# Patient Record
Sex: FEMALE | Race: AMERICAN INDIAN OR ALASKA NATIVE | NOT HISPANIC OR LATINO | URBAN - METROPOLITAN AREA
[De-identification: names, ages, dates, MRNs, and addresses within clinical notes are randomized per-mention and may not be internally consistent; named-entity substitution may affect disease eponyms.]

---

## 2020-01-15 ENCOUNTER — INPATIENT (INPATIENT)
Facility: HOSPITAL | Age: 75
LOS: 1 days | Discharge: HOME | End: 2020-01-17
Attending: INTERNAL MEDICINE | Admitting: INTERNAL MEDICINE
Payer: MEDICARE

## 2020-01-15 VITALS
HEART RATE: 89 BPM | SYSTOLIC BLOOD PRESSURE: 153 MMHG | DIASTOLIC BLOOD PRESSURE: 92 MMHG | WEIGHT: 100.09 LBS | TEMPERATURE: 97 F | RESPIRATION RATE: 17 BRPM | OXYGEN SATURATION: 96 %

## 2020-01-15 DIAGNOSIS — Z98.890 OTHER SPECIFIED POSTPROCEDURAL STATES: Chronic | ICD-10-CM

## 2020-01-15 LAB
ALBUMIN SERPL ELPH-MCNC: 3.7 G/DL — SIGNIFICANT CHANGE UP (ref 3.5–5.2)
ALP SERPL-CCNC: 100 U/L — SIGNIFICANT CHANGE UP (ref 30–115)
ALT FLD-CCNC: 33 U/L — SIGNIFICANT CHANGE UP (ref 0–41)
ANION GAP SERPL CALC-SCNC: 12 MMOL/L — SIGNIFICANT CHANGE UP (ref 7–14)
APPEARANCE UR: CLEAR — SIGNIFICANT CHANGE UP
APTT BLD: 33.5 SEC — SIGNIFICANT CHANGE UP (ref 27–39.2)
AST SERPL-CCNC: 57 U/L — HIGH (ref 0–41)
BACTERIA # UR AUTO: NEGATIVE — SIGNIFICANT CHANGE UP
BASOPHILS # BLD AUTO: 0.03 K/UL — SIGNIFICANT CHANGE UP (ref 0–0.2)
BASOPHILS NFR BLD AUTO: 0.4 % — SIGNIFICANT CHANGE UP (ref 0–1)
BILIRUB SERPL-MCNC: 1 MG/DL — SIGNIFICANT CHANGE UP (ref 0.2–1.2)
BILIRUB UR-MCNC: NEGATIVE — SIGNIFICANT CHANGE UP
BUN SERPL-MCNC: 23 MG/DL — HIGH (ref 10–20)
CALCIUM SERPL-MCNC: 9.8 MG/DL — SIGNIFICANT CHANGE UP (ref 8.5–10.1)
CHLORIDE SERPL-SCNC: 99 MMOL/L — SIGNIFICANT CHANGE UP (ref 98–110)
CK MB CFR SERPL CALC: <1 NG/ML — SIGNIFICANT CHANGE UP (ref 0.6–6.3)
CK MB CFR SERPL CALC: <1 NG/ML — SIGNIFICANT CHANGE UP (ref 0.6–6.3)
CK SERPL-CCNC: 65 U/L — SIGNIFICANT CHANGE UP (ref 0–225)
CO2 SERPL-SCNC: 19 MMOL/L — SIGNIFICANT CHANGE UP (ref 17–32)
COLOR SPEC: YELLOW — SIGNIFICANT CHANGE UP
CREAT SERPL-MCNC: 0.7 MG/DL — SIGNIFICANT CHANGE UP (ref 0.7–1.5)
DIFF PNL FLD: NEGATIVE — SIGNIFICANT CHANGE UP
EOSINOPHIL # BLD AUTO: 0.01 K/UL — SIGNIFICANT CHANGE UP (ref 0–0.7)
EOSINOPHIL NFR BLD AUTO: 0.1 % — SIGNIFICANT CHANGE UP (ref 0–8)
EPI CELLS # UR: 1 /HPF — SIGNIFICANT CHANGE UP (ref 0–5)
FLU A RESULT: NEGATIVE — SIGNIFICANT CHANGE UP
FLU A RESULT: NEGATIVE — SIGNIFICANT CHANGE UP
FLUAV AG NPH QL: NEGATIVE — SIGNIFICANT CHANGE UP
FLUBV AG NPH QL: NEGATIVE — SIGNIFICANT CHANGE UP
GLUCOSE BLDC GLUCOMTR-MCNC: 116 MG/DL — HIGH (ref 70–99)
GLUCOSE BLDC GLUCOMTR-MCNC: 227 MG/DL — HIGH (ref 70–99)
GLUCOSE SERPL-MCNC: 162 MG/DL — HIGH (ref 70–99)
GLUCOSE UR QL: ABNORMAL
HCT VFR BLD CALC: 31.7 % — LOW (ref 37–47)
HGB BLD-MCNC: 11 G/DL — LOW (ref 12–16)
HYALINE CASTS # UR AUTO: 0 /LPF — SIGNIFICANT CHANGE UP (ref 0–7)
IMM GRANULOCYTES NFR BLD AUTO: 0.9 % — HIGH (ref 0.1–0.3)
INR BLD: 1.03 RATIO — SIGNIFICANT CHANGE UP (ref 0.65–1.3)
KETONES UR-MCNC: NEGATIVE — SIGNIFICANT CHANGE UP
LEUKOCYTE ESTERASE UR-ACNC: NEGATIVE — SIGNIFICANT CHANGE UP
LYMPHOCYTES # BLD AUTO: 0.85 K/UL — LOW (ref 1.2–3.4)
LYMPHOCYTES # BLD AUTO: 10.7 % — LOW (ref 20.5–51.1)
MAGNESIUM SERPL-MCNC: 1.9 MG/DL — SIGNIFICANT CHANGE UP (ref 1.8–2.4)
MCHC RBC-ENTMCNC: 31.8 PG — HIGH (ref 27–31)
MCHC RBC-ENTMCNC: 34.7 G/DL — SIGNIFICANT CHANGE UP (ref 32–37)
MCV RBC AUTO: 91.6 FL — SIGNIFICANT CHANGE UP (ref 81–99)
MONOCYTES # BLD AUTO: 0.63 K/UL — HIGH (ref 0.1–0.6)
MONOCYTES NFR BLD AUTO: 7.9 % — SIGNIFICANT CHANGE UP (ref 1.7–9.3)
NEUTROPHILS # BLD AUTO: 6.39 K/UL — SIGNIFICANT CHANGE UP (ref 1.4–6.5)
NEUTROPHILS NFR BLD AUTO: 80 % — HIGH (ref 42.2–75.2)
NITRITE UR-MCNC: NEGATIVE — SIGNIFICANT CHANGE UP
NRBC # BLD: 0 /100 WBCS — SIGNIFICANT CHANGE UP (ref 0–0)
NT-PROBNP SERPL-SCNC: 783 PG/ML — HIGH (ref 0–300)
PH UR: 6.5 — SIGNIFICANT CHANGE UP (ref 5–8)
PLATELET # BLD AUTO: 206 K/UL — SIGNIFICANT CHANGE UP (ref 130–400)
POTASSIUM SERPL-MCNC: 4.2 MMOL/L — SIGNIFICANT CHANGE UP (ref 3.5–5)
POTASSIUM SERPL-SCNC: 4.2 MMOL/L — SIGNIFICANT CHANGE UP (ref 3.5–5)
PROT SERPL-MCNC: 7.6 G/DL — SIGNIFICANT CHANGE UP (ref 6–8)
PROT UR-MCNC: ABNORMAL
PROTHROM AB SERPL-ACNC: 11.8 SEC — SIGNIFICANT CHANGE UP (ref 9.95–12.87)
RBC # BLD: 3.46 M/UL — LOW (ref 4.2–5.4)
RBC # FLD: 17.4 % — HIGH (ref 11.5–14.5)
RBC CASTS # UR COMP ASSIST: 1 /HPF — SIGNIFICANT CHANGE UP (ref 0–4)
RSV RESULT: NEGATIVE — SIGNIFICANT CHANGE UP
RSV RNA RESP QL NAA+PROBE: NEGATIVE — SIGNIFICANT CHANGE UP
SODIUM SERPL-SCNC: 130 MMOL/L — LOW (ref 135–146)
SP GR SPEC: 1.01 — LOW (ref 1.01–1.02)
TROPONIN T SERPL-MCNC: <0.01 NG/ML — SIGNIFICANT CHANGE UP
TROPONIN T SERPL-MCNC: <0.01 NG/ML — SIGNIFICANT CHANGE UP
UROBILINOGEN FLD QL: SIGNIFICANT CHANGE UP
WBC # BLD: 7.98 K/UL — SIGNIFICANT CHANGE UP (ref 4.8–10.8)
WBC # FLD AUTO: 7.98 K/UL — SIGNIFICANT CHANGE UP (ref 4.8–10.8)
WBC UR QL: 1 /HPF — SIGNIFICANT CHANGE UP (ref 0–5)

## 2020-01-15 PROCEDURE — 93010 ELECTROCARDIOGRAM REPORT: CPT | Mod: 76

## 2020-01-15 PROCEDURE — 99291 CRITICAL CARE FIRST HOUR: CPT

## 2020-01-15 PROCEDURE — 71045 X-RAY EXAM CHEST 1 VIEW: CPT | Mod: 26

## 2020-01-15 RX ORDER — DEXTROSE 50 % IN WATER 50 %
25 SYRINGE (ML) INTRAVENOUS ONCE
Refills: 0 | Status: DISCONTINUED | OUTPATIENT
Start: 2020-01-15 | End: 2020-01-17

## 2020-01-15 RX ORDER — INSULIN LISPRO 100/ML
VIAL (ML) SUBCUTANEOUS
Refills: 0 | Status: DISCONTINUED | OUTPATIENT
Start: 2020-01-15 | End: 2020-01-17

## 2020-01-15 RX ORDER — LOSARTAN POTASSIUM 100 MG/1
100 TABLET, FILM COATED ORAL DAILY
Refills: 0 | Status: DISCONTINUED | OUTPATIENT
Start: 2020-01-15 | End: 2020-01-17

## 2020-01-15 RX ORDER — ENOXAPARIN SODIUM 100 MG/ML
40 INJECTION SUBCUTANEOUS DAILY
Refills: 0 | Status: DISCONTINUED | OUTPATIENT
Start: 2020-01-15 | End: 2020-01-17

## 2020-01-15 RX ORDER — ATENOLOL 25 MG/1
50 TABLET ORAL DAILY
Refills: 0 | Status: DISCONTINUED | OUTPATIENT
Start: 2020-01-15 | End: 2020-01-17

## 2020-01-15 RX ORDER — DEXTROSE 50 % IN WATER 50 %
12.5 SYRINGE (ML) INTRAVENOUS ONCE
Refills: 0 | Status: DISCONTINUED | OUTPATIENT
Start: 2020-01-15 | End: 2020-01-17

## 2020-01-15 RX ORDER — PANTOPRAZOLE SODIUM 20 MG/1
40 TABLET, DELAYED RELEASE ORAL
Refills: 0 | Status: DISCONTINUED | OUTPATIENT
Start: 2020-01-15 | End: 2020-01-17

## 2020-01-15 RX ORDER — LIPASE/PROTEASE/AMYLASE 16-48-48K
1 CAPSULE,DELAYED RELEASE (ENTERIC COATED) ORAL
Refills: 0 | Status: DISCONTINUED | OUTPATIENT
Start: 2020-01-15 | End: 2020-01-15

## 2020-01-15 RX ORDER — LIPASE/PROTEASE/AMYLASE 16-48-48K
2 CAPSULE,DELAYED RELEASE (ENTERIC COATED) ORAL
Refills: 0 | Status: DISCONTINUED | OUTPATIENT
Start: 2020-01-15 | End: 2020-01-17

## 2020-01-15 RX ORDER — ACETAMINOPHEN 500 MG
975 TABLET ORAL ONCE
Refills: 0 | Status: COMPLETED | OUTPATIENT
Start: 2020-01-15 | End: 2020-01-15

## 2020-01-15 RX ORDER — RANOLAZINE 500 MG/1
500 TABLET, FILM COATED, EXTENDED RELEASE ORAL
Refills: 0 | Status: DISCONTINUED | OUTPATIENT
Start: 2020-01-15 | End: 2020-01-17

## 2020-01-15 RX ORDER — SODIUM CHLORIDE 9 MG/ML
1500 INJECTION, SOLUTION INTRAVENOUS ONCE
Refills: 0 | Status: COMPLETED | OUTPATIENT
Start: 2020-01-15 | End: 2020-01-15

## 2020-01-15 RX ORDER — SODIUM CHLORIDE 9 MG/ML
1000 INJECTION, SOLUTION INTRAVENOUS
Refills: 0 | Status: DISCONTINUED | OUTPATIENT
Start: 2020-01-15 | End: 2020-01-17

## 2020-01-15 RX ORDER — DEXTROSE 50 % IN WATER 50 %
15 SYRINGE (ML) INTRAVENOUS ONCE
Refills: 0 | Status: DISCONTINUED | OUTPATIENT
Start: 2020-01-15 | End: 2020-01-17

## 2020-01-15 RX ORDER — GLUCAGON INJECTION, SOLUTION 0.5 MG/.1ML
1 INJECTION, SOLUTION SUBCUTANEOUS ONCE
Refills: 0 | Status: DISCONTINUED | OUTPATIENT
Start: 2020-01-15 | End: 2020-01-17

## 2020-01-15 RX ORDER — ASPIRIN/CALCIUM CARB/MAGNESIUM 324 MG
81 TABLET ORAL DAILY
Refills: 0 | Status: DISCONTINUED | OUTPATIENT
Start: 2020-01-15 | End: 2020-01-17

## 2020-01-15 RX ORDER — INSULIN LISPRO 100/ML
6 VIAL (ML) SUBCUTANEOUS
Refills: 0 | Status: DISCONTINUED | OUTPATIENT
Start: 2020-01-15 | End: 2020-01-17

## 2020-01-15 RX ORDER — INSULIN GLARGINE 100 [IU]/ML
9 INJECTION, SOLUTION SUBCUTANEOUS AT BEDTIME
Refills: 0 | Status: DISCONTINUED | OUTPATIENT
Start: 2020-01-15 | End: 2020-01-17

## 2020-01-15 RX ORDER — ATORVASTATIN CALCIUM 80 MG/1
20 TABLET, FILM COATED ORAL AT BEDTIME
Refills: 0 | Status: DISCONTINUED | OUTPATIENT
Start: 2020-01-15 | End: 2020-01-17

## 2020-01-15 RX ADMIN — RANOLAZINE 500 MILLIGRAM(S): 500 TABLET, FILM COATED, EXTENDED RELEASE ORAL at 17:35

## 2020-01-15 RX ADMIN — SODIUM CHLORIDE 1500 MILLILITER(S): 9 INJECTION, SOLUTION INTRAVENOUS at 13:20

## 2020-01-15 RX ADMIN — INSULIN GLARGINE 9 UNIT(S): 100 INJECTION, SOLUTION SUBCUTANEOUS at 22:28

## 2020-01-15 RX ADMIN — Medication 975 MILLIGRAM(S): at 13:30

## 2020-01-15 RX ADMIN — ATORVASTATIN CALCIUM 20 MILLIGRAM(S): 80 TABLET, FILM COATED ORAL at 22:30

## 2020-01-15 NOTE — ED PROVIDER NOTE - OBJECTIVE STATEMENT
74 y.o female w/ hx of HTN, HLD, MI presents to the ED for evaluation of multiple complaints.  Per family has been experiencing URI sxs x 3 days.  Intermittently productive cough, congestion and feer.  Over past 2 days midsternal chest pressure, worse w/ exertion, no radiation, nonpleuritic, moderate severity. Does not know if this feels similar to previous MI.  Denies back pain, dyspnea, edema of lower extremities, calf pain, hemoptysis.

## 2020-01-15 NOTE — H&P ADULT - HISTORY OF PRESENT ILLNESS
74 y/f with PMH of CAD s/p stents in 2018, DM, Pancreatic cancer was brought in by family for chest pain that started this AM.   As per the son at the bedside, the chest pain is midsternal, throbbing, non radiating, not related to exertion, constant. She doesnt have any cough, but was having congestion and was trying to get phlegm out but not able to do so. She didnt have any fever or chills at home. She has chronic intermittent belly pain, which family attributes to her pancreatic cancer, in the past she had pancreatic tail resection, partial liver resection, however the cancer recently recurred  and was told that it is inoperable. She has chronic intemittent diarrhea, no change in urinary habits. No lower ext swelling or edema. Her mental status has worsened over the past few months.

## 2020-01-15 NOTE — ED PROVIDER NOTE - NS ED ROS FT
Constitutional: See HPI.  Eyes: No visual changes, eye pain or discharge. No Photophobia  ENMT: + nasal congestion. No hearing changes, pain, discharge or infections. No neck pain or stiffness. No limited ROM  Cardiac: + chest pain. No SOB or edema.   Respiratory: + cough. No  respiratory distress. No hemoptysis.   GI: No nausea, vomiting, diarrhea or abdominal pain.  : No dysuria, frequency or burning. No Discharge  MS: No myalgia, muscle weakness, joint pain or back pain.  Neuro: No headache or weakness.   Skin: No skin rash.  Except as documented in the HPI, all other systems are negative.

## 2020-01-15 NOTE — H&P ADULT - NSHPPHYSICALEXAM_GEN_ALL_CORE
T(C): 37 (01-15-20 @ 15:42), Max: 38.3 (01-15-20 @ 13:20)  HR: 78 (01-15-20 @ 15:42) (78 - 89)  BP: 134/76 (01-15-20 @ 15:42) (134/76 - 153/92)  RR: 16 (01-15-20 @ 15:42) (16 - 18)  SpO2: 97% (01-15-20 @ 15:42) (96% - 100%)  PHYSICAL EXAM:    Constitutional: Lying in bed no acute distress    Eyes: PERLAA    ENMT: No facial deviation    Neck: No mass/ JVD    Respiratory: B/l clear, no crackles, wheeze    Cardiovascular: Regular rate and rhythm, no murmur    Gastrointestinal: soft non tender, no organomegaly    Extremities: No edema, B/l lower ext    Neurological: AO x 3, non focal

## 2020-01-15 NOTE — ED ADULT NURSE NOTE - OBJECTIVE STATEMENT
pt c/o URI symptoms for past couple of days, + cough and chest discomfort, having worsening generalized malaise and unable to ambulate today. t max at home was 99.6. STEMI code activated

## 2020-01-15 NOTE — H&P ADULT - NSICDXPASTMEDICALHX_GEN_ALL_CORE_FT
PAST MEDICAL HISTORY:  Diabetes     Hypertension     MI (myocardial infarction)     Pancreatic cancer

## 2020-01-15 NOTE — ED PROVIDER NOTE - ATTENDING CONTRIBUTION TO CARE
Patient is a 73 yo female h/o MI s/p intervention family unsure if stent or balloon angioplast. Patient here for 3 days of cough congestion subjective fever and pleuritic midsrternal cp, 8/10, unsure if feels liek when she had an MI in the past. No SOB abd pain back pain    Patient awake alert oriented uncomfortable appearing CTAB RRRR no murmur abd soft nt    EKG with elevation in AVR and inferiorlateral depressions including in V2 V3, posterior EK performed no STEMI, given chest pain and EKG changes in AVR and generalzied STEMI called. Cardiology evaluted bedside echo done WNL cancelled STEMI. workup negative thus far will require admission for repeat tropnin and inpatient workup for chest pain with elevated amalia/heart score and EKG changes family comfortable with plan    I spent 50 minutes of critical care time with the patient bedside discussions labs and imaging and ekg interpretation consultation

## 2020-01-15 NOTE — ED PROVIDER NOTE - CARE PLAN
Principal Discharge DX:	Chest pain  Secondary Diagnosis:	URI (upper respiratory infection)  Secondary Diagnosis:	Fever

## 2020-01-15 NOTE — H&P ADULT - ASSESSMENT
74 y/f with PMH of CAD s/p stents in 2018, DM, Pancreatic cancer was brought in by family for chest pain that started this AM and congestion for few days:    # Atypical chest pain;    EKG shows T wave inversion in anterolateral leads, ST elevation in aVR  STEMI code called in ED, was cancelled by Cardio team  Troponin 1st set negative, f/u x 2  F/u cardiology  F/u official ECHO (bedside echo unremarkable)  C/w Statin, Aspirin, Atenolol    # Fever:  With associated congestion, could be Upper respiratory tract infection, supportive care  Flu -ve, CXR clear  UA clear    # DM:  Monitor FS and insulin if >180    #Pancreatic cancer:  C/w Creon  Will f/u with oncology as outpatient    #Diet:  DASH/ Carb consistent    # Activity:  As tolerated    # DVT ppx:  lovenox

## 2020-01-15 NOTE — CHART NOTE - NSCHARTNOTEFT_GEN_A_CORE
STEMI code was called overhead, and I arrived to physically examine and evaluate the patient at the bedside. Pt. speaks a dialect of Cantonese, and translation was done by son and daughter at the bedside. Pt. reports having sob, cough, chest pain, and subjective fevers, for the past few days. Pt. reported had CAD, MI and PCI in 2018 as per family. EKG showed mild aVR ST elevation and depressions in inferior, and anterolateral leads. Quick bedside echo showed grossly normal LV function, and no significant wall motion abnormalities. STEMI code was cancelled after discussion with the interventionalist on call (Dr. Pierson). Recommend admitting pt. with workup for ACS, and rule out other etiologies of chest pain. Detailed consult note to follow.     EKG: NSR, mild aVR ST elevation and depressions in inferior, and anterolateral leads.

## 2020-01-15 NOTE — ED ADULT NURSE NOTE - NSIMPLEMENTINTERV_GEN_ALL_ED
Implemented All Fall with Harm Risk Interventions:  [place] to call system. Call bell, personal items and telephone within reach. Instruct patient to call for assistance. Room bathroom lighting operational. Non-slip footwear when patient is off stretcher. Physically safe environment: no spills, clutter or unnecessary equipment. Stretcher in lowest position, wheels locked, appropriate side rails in place. Provide visual cue, wrist band, yellow gown, etc. Monitor gait and stability. Monitor for mental status changes and reorient to person, place, and time. Review medications for side effects contributing to fall risk. Reinforce activity limits and safety measures with patient and family. Provide visual clues: red socks.

## 2020-01-15 NOTE — H&P ADULT - ATTENDING COMMENTS
HPI as above.  Interval history: Pt seen and examined at bedside. No sob.  No further chest pain. Pt did state that she when she had the chest pain it radiated to her back. Pt has hx of MI in the past.   Vital Signs (24 Hrs):  T(C): 37.1 (01-16-20 @ 07:33), Max: 38.6 (01-16-20 @ 06:52)  HR: 84 (01-16-20 @ 07:33) (70 - 84)  BP: 146/78 (01-16-20 @ 07:33) (134/76 - 146/78)  RR: 18 (01-16-20 @ 07:33) (16 - 18)  SpO2: 95% (01-16-20 @ 07:33) (95% - 97%)  Wt(kg): --  Daily     Daily     I&O's Summary    PHYSICAL EXAM:  GENERAL: NAD, well-developed  HEAD:  Atraumatic, Normocephalic  EYES: EOMI, PERRLA, conjunctiva and sclera clear  NECK: Supple, No JVD, bulging neck vein   CHEST/LUNG: Clear to auscultation bilaterally; No wheeze  HEART: Regular rate and rhythm; No murmurs, rubs, or gallops  ABDOMEN: Soft, Nontender, Nondistended; Bowel sounds present  EXTREMITIES:  2+ Peripheral Pulses, No clubbing, cyanosis, or edema  PSYCH: AAOx3  NEUROLOGY: non-focal  SKIN: No rashes or lesions    Labs reviewed  Imaging reviewed: < from: Xray Chest 1 View-PORTABLE IMMEDIATE (01.16.20 @ 08:33) >    Impression:      No radiographic evidence of acute cardiopulmonary disease.    < end of copied text >      EKG reviewed: < from: 12 Lead ECG (01.15.20 @ 13:30) >    Diagnosis Line Normal sinus rhythm  Anterior infarct , age undetermined  ST & T wave abnormality, consider lateral ischemia  Abnormal ECG    < end of copied text >    Plan  #Chest pain r/o ACS- likely Atypical  -given the radaition to the back will r/o aortic dissection. CT chest   -Trop neg x3   -EKG changes are old   -follow up echo    #Fever   -UA neg  -cxr neg  -dvt neg, Venous thrombosis of left peroneal vein branch   -Flu and rsv neg   -Follow up blood culture   -If spikes fever again, start broad spectrum antibiotics, ID consult     #rest as above    #Progress Note Handoff  Pending (specify):  Consults___cardio______, Tests________, Test Results_______, Other_________  Family discussion: lexa pt family and agreed to plan  Disposition: Home__x_/SNF___/Other________/Unknown at this time________

## 2020-01-15 NOTE — ED PROVIDER NOTE - PHYSICAL EXAMINATION
CONST: mild distress.  EYES:  Sclera and conjunctiva clear.  NECK: Non-tender, supple,  CARD: Normal S1 S2; Normal rate and rhythm  RESP: Equal BS B/L, No wheezes, rhonchi or rales. No distress  GI: Soft, non-tender, non-distended.  MS: Normal ROM in all extremities. No edema of lower extremities, no calf pain, radial pulses 2+ bilaterally  SKIN: Warm, dry, no acute rashes. Good turgor  NEURO: A&Ox3, No focal deficits. Strength 5/5 with no sensory deficits

## 2020-01-15 NOTE — H&P ADULT - NSHPLABSRESULTS_GEN_ALL_CORE
11.0   7.98  )-----------( 206      ( 15 Nelson 2020 13:25 )             31.7       01-15    130<L>  |  99  |  23<H>  ----------------------------<  162<H>  4.2   |  19  |  0.7    Ca    9.8      15 Nelson 2020 13:25  Mg     1.9     01-15    TPro  7.6  /  Alb  3.7  /  TBili  1.0  /  DBili  x   /  AST  57<H>  /  ALT  33  /  AlkPhos  100  01-15

## 2020-01-16 LAB
ALBUMIN SERPL ELPH-MCNC: 3.6 G/DL — SIGNIFICANT CHANGE UP (ref 3.5–5.2)
ALP SERPL-CCNC: 126 U/L — HIGH (ref 30–115)
ALT FLD-CCNC: 59 U/L — HIGH (ref 0–41)
ANION GAP SERPL CALC-SCNC: 12 MMOL/L — SIGNIFICANT CHANGE UP (ref 7–14)
AST SERPL-CCNC: 112 U/L — HIGH (ref 0–41)
BILIRUB SERPL-MCNC: 0.8 MG/DL — SIGNIFICANT CHANGE UP (ref 0.2–1.2)
BUN SERPL-MCNC: 20 MG/DL — SIGNIFICANT CHANGE UP (ref 10–20)
CALCIUM SERPL-MCNC: 9.7 MG/DL — SIGNIFICANT CHANGE UP (ref 8.5–10.1)
CHLORIDE SERPL-SCNC: 105 MMOL/L — SIGNIFICANT CHANGE UP (ref 98–110)
CK MB CFR SERPL CALC: 1 NG/ML — SIGNIFICANT CHANGE UP (ref 0.6–6.3)
CO2 SERPL-SCNC: 22 MMOL/L — SIGNIFICANT CHANGE UP (ref 17–32)
CREAT SERPL-MCNC: 0.7 MG/DL — SIGNIFICANT CHANGE UP (ref 0.7–1.5)
CULTURE RESULTS: SIGNIFICANT CHANGE UP
ESTIMATED AVERAGE GLUCOSE: 131 MG/DL — HIGH (ref 68–114)
GLUCOSE BLDC GLUCOMTR-MCNC: 114 MG/DL — HIGH (ref 70–99)
GLUCOSE BLDC GLUCOMTR-MCNC: 118 MG/DL — HIGH (ref 70–99)
GLUCOSE BLDC GLUCOMTR-MCNC: 126 MG/DL — HIGH (ref 70–99)
GLUCOSE BLDC GLUCOMTR-MCNC: 247 MG/DL — HIGH (ref 70–99)
GLUCOSE SERPL-MCNC: 134 MG/DL — HIGH (ref 70–99)
HBA1C BLD-MCNC: 6.2 % — HIGH (ref 4–5.6)
HCT VFR BLD CALC: 32.1 % — LOW (ref 37–47)
HGB BLD-MCNC: 10.6 G/DL — LOW (ref 12–16)
MCHC RBC-ENTMCNC: 30.2 PG — SIGNIFICANT CHANGE UP (ref 27–31)
MCHC RBC-ENTMCNC: 33 G/DL — SIGNIFICANT CHANGE UP (ref 32–37)
MCV RBC AUTO: 91.5 FL — SIGNIFICANT CHANGE UP (ref 81–99)
NRBC # BLD: 0 /100 WBCS — SIGNIFICANT CHANGE UP (ref 0–0)
PLATELET # BLD AUTO: 196 K/UL — SIGNIFICANT CHANGE UP (ref 130–400)
POTASSIUM SERPL-MCNC: 4 MMOL/L — SIGNIFICANT CHANGE UP (ref 3.5–5)
POTASSIUM SERPL-SCNC: 4 MMOL/L — SIGNIFICANT CHANGE UP (ref 3.5–5)
PROT SERPL-MCNC: 7.4 G/DL — SIGNIFICANT CHANGE UP (ref 6–8)
RBC # BLD: 3.51 M/UL — LOW (ref 4.2–5.4)
RBC # FLD: 17.4 % — HIGH (ref 11.5–14.5)
SODIUM SERPL-SCNC: 139 MMOL/L — SIGNIFICANT CHANGE UP (ref 135–146)
SPECIMEN SOURCE: SIGNIFICANT CHANGE UP
TROPONIN T SERPL-MCNC: <0.01 NG/ML — SIGNIFICANT CHANGE UP
WBC # BLD: 6.47 K/UL — SIGNIFICANT CHANGE UP (ref 4.8–10.8)
WBC # FLD AUTO: 6.47 K/UL — SIGNIFICANT CHANGE UP (ref 4.8–10.8)

## 2020-01-16 PROCEDURE — 93970 EXTREMITY STUDY: CPT | Mod: 26

## 2020-01-16 PROCEDURE — 93880 EXTRACRANIAL BILAT STUDY: CPT | Mod: 26

## 2020-01-16 PROCEDURE — 99223 1ST HOSP IP/OBS HIGH 75: CPT

## 2020-01-16 PROCEDURE — 71275 CT ANGIOGRAPHY CHEST: CPT | Mod: 26

## 2020-01-16 PROCEDURE — 71045 X-RAY EXAM CHEST 1 VIEW: CPT | Mod: 26

## 2020-01-16 PROCEDURE — 93306 TTE W/DOPPLER COMPLETE: CPT | Mod: 26

## 2020-01-16 RX ORDER — ATENOLOL 25 MG/1
1 TABLET ORAL
Qty: 0 | Refills: 0 | DISCHARGE

## 2020-01-16 RX ORDER — ACETAMINOPHEN 500 MG
650 TABLET ORAL ONCE
Refills: 0 | Status: COMPLETED | OUTPATIENT
Start: 2020-01-16 | End: 2020-01-16

## 2020-01-16 RX ORDER — LIPASE/PROTEASE/AMYLASE 16-48-48K
0 CAPSULE,DELAYED RELEASE (ENTERIC COATED) ORAL
Qty: 0 | Refills: 0 | DISCHARGE

## 2020-01-16 RX ORDER — GLIPIZIDE/METFORMIN HCL 2.5-500 MG
0 TABLET ORAL
Qty: 0 | Refills: 0 | DISCHARGE

## 2020-01-16 RX ORDER — RANOLAZINE 500 MG/1
1 TABLET, FILM COATED, EXTENDED RELEASE ORAL
Qty: 0 | Refills: 0 | DISCHARGE

## 2020-01-16 RX ORDER — ICOSAPENT ETHYL 500 MG/1
2 CAPSULE, LIQUID FILLED ORAL
Qty: 0 | Refills: 0 | DISCHARGE

## 2020-01-16 RX ORDER — ESOMEPRAZOLE MAGNESIUM 40 MG/1
1 CAPSULE, DELAYED RELEASE ORAL
Qty: 0 | Refills: 0 | DISCHARGE

## 2020-01-16 RX ORDER — EMPAGLIFLOZIN 10 MG/1
1 TABLET, FILM COATED ORAL
Qty: 0 | Refills: 0 | DISCHARGE

## 2020-01-16 RX ORDER — LOSARTAN POTASSIUM 100 MG/1
1 TABLET, FILM COATED ORAL
Qty: 0 | Refills: 0 | DISCHARGE

## 2020-01-16 RX ORDER — ASPIRIN/CALCIUM CARB/MAGNESIUM 324 MG
1 TABLET ORAL
Qty: 0 | Refills: 0 | DISCHARGE

## 2020-01-16 RX ORDER — ATORVASTATIN CALCIUM 80 MG/1
1 TABLET, FILM COATED ORAL
Qty: 0 | Refills: 0 | DISCHARGE

## 2020-01-16 RX ADMIN — RANOLAZINE 500 MILLIGRAM(S): 500 TABLET, FILM COATED, EXTENDED RELEASE ORAL at 17:58

## 2020-01-16 RX ADMIN — ATENOLOL 50 MILLIGRAM(S): 25 TABLET ORAL at 06:40

## 2020-01-16 RX ADMIN — ATORVASTATIN CALCIUM 20 MILLIGRAM(S): 80 TABLET, FILM COATED ORAL at 21:05

## 2020-01-16 RX ADMIN — Medication 650 MILLIGRAM(S): at 06:55

## 2020-01-16 RX ADMIN — INSULIN GLARGINE 9 UNIT(S): 100 INJECTION, SOLUTION SUBCUTANEOUS at 21:04

## 2020-01-16 RX ADMIN — PANTOPRAZOLE SODIUM 40 MILLIGRAM(S): 20 TABLET, DELAYED RELEASE ORAL at 06:40

## 2020-01-16 RX ADMIN — LOSARTAN POTASSIUM 100 MILLIGRAM(S): 100 TABLET, FILM COATED ORAL at 06:40

## 2020-01-16 RX ADMIN — RANOLAZINE 500 MILLIGRAM(S): 500 TABLET, FILM COATED, EXTENDED RELEASE ORAL at 06:40

## 2020-01-16 RX ADMIN — Medication 81 MILLIGRAM(S): at 11:55

## 2020-01-16 RX ADMIN — Medication 2 CAPSULE(S): at 17:58

## 2020-01-16 RX ADMIN — ENOXAPARIN SODIUM 40 MILLIGRAM(S): 100 INJECTION SUBCUTANEOUS at 11:55

## 2020-01-16 NOTE — CONSULT NOTE ADULT - SUBJECTIVE AND OBJECTIVE BOX
Date of Admission: 1/15    CHIEF COMPLAINT: chest pain, cough, fever, sob    HISTORY OF PRESENT ILLNESS:   74 y/f with PMH of CAD s/p stents in 2018, DM, Pancreatic cancer was brought in by family for chest pain that started this AM.   As per the son at the bedside, the chest pain is midsternal, throbbing, non radiating, not related to exertion, constant. She doesn't have any cough, but was having congestion and was trying to get phlegm out but not able to do so. She didn't have any fever or chills at home. She has chronic intermittent belly pain, which family attributes to her pancreatic cancer, in the past she had pancreatic tail resection, partial liver resection, however the cancer recently recurred  and was told that it is inoperable. She has chronic intemittent diarrhea, no change in urinary habits. No lower ext swelling or edema. Her mental status has worsened over the past few months.     Cardiology HPI:  Pt. was seen initially as a STEMI code which was cancelled (see STEMI code note for details). Pt. had tenderness to palpation over the anterior chest wall, was sob, had cough initially non-productive, but now productive of dark/sachi sputum, and reported having subjective fevers. Pt. currently reports improvement in chest pain, however complains of right sided neck pain.    PAST MEDICAL & SURGICAL HISTORY:  CAD  MI (myocardial infarction)  Diabetes  Hypertension  Pancreatic cancer  History of pancreatic surgery      FAMILY HISTORY:  [x] no pertinent family history of premature cardiovascular disease in first degree relatives.  Mother:   Father:   Siblings:     SOCIAL HISTORY:    [x] Non-smoker  [ ] Smoker  [ ] Alcohol    Allergies    Allergy Status Unknown    Intolerances    	    REVIEW OF SYSTEMS:  CONSTITUTIONAL: No weight loss, or fatigue  CARDIOLOGY: denies syncopal episodes.   RESPIRATORY: denies shortness of breath, wheezeing.   NEUROLOGICAL: No weakness, no focal deficits to report.  ENDOCRINOLOGICAL: no recent change in diabetic medications.   GI: no BRBPR, no N,V,diarrhea.    PSYCHIATRY: normal mood and affect  HEENT: no nasal discharge, no ecchymosis  SKIN: no ecchymosis, no breakdown  MUSCULOSKELETAL: Full range of motion x4.     PHYSICAL EXAM:  T(C): 36.6 (01-15-20 @ 23:05), Max: 38.3 (01-15-20 @ 13:20) 100.9 F  HR: 70 (01-15-20 @ 23:05) (70 - 89)  BP: 145/70 (01-15-20 @ 23:05) (134/76 - 153/92)  RR: 18 (01-15-20 @ 23:05) (16 - 18)  SpO2: 96% (01-15-20 @ 23:05) (96% - 100%)  Wt(kg): --  I&O's Summary      General Appearance: well appearing, normal for age and gender. 	  Neck: normal JVP, no bruit.   Eyes: No xanthomalasia, Extra ocular muscles intact.   Cardiovascular: regular rate and rhythm S1 S2, No JVD, No murmurs, No edema  Respiratory: Lungs clear to auscultation	  Psychiatry: Alert and oriented x 3, Mood & affect appropriate  Gastrointestinal:  Soft, Non-tender  Skin/Integumen: No rashes, No ecchymoses, No cyanosis	  Neurologic: Non-focal  Musculoskeletal/ extremities: Normal range of motion, No clubbing, cyanosis or edema  Vascular: Peripheral pulses palpable 2+ bilaterally    LABS:	 	                          11.0   7.98  )-----------( 206      ( 15 Nelson 2020 13:25 )             31.7     01-15    130<L>  |  99  |  23<H>  ----------------------------<  162<H>  4.2   |  19  |  0.7    Ca    9.8      15 Nelson 2020 13:25  Mg     1.9     01-15    TPro  7.6  /  Alb  3.7  /  TBili  1.0  /  DBili  x   /  AST  57<H>  /  ALT  33  /  AlkPhos  100  01-15    CARDIAC MARKERS ( 16 Jan 2020 00:08 )  x     / <0.01 ng/mL / x     / x     / 1.0 ng/mL  CARDIAC MARKERS ( 15 Nelson 2020 20:48 )  x     / <0.01 ng/mL / x     / x     / <1.0 ng/mL  CARDIAC MARKERS ( 15 Nelson 2020 13:25 )  x     / <0.01 ng/mL / 65 U/L / x     / <1.0 ng/mL      PT/INR - ( 15 Nelson 2020 13:25 )   PT: 11.80 sec;   INR: 1.03 ratio         PTT - ( 15 Nelson 2020 13:25 )  PTT:33.5 sec    CARDIAC MARKERS:            TELEMETRY EVENTS: 	    none    ECG:  	  NSR@86, inferior ST depressions, anterolateral TW inversions    RADIOLOGY:  CXR: cardiomegaly    PREVIOUS DIAGNOSTIC TESTING:    [x] Echocardiogram:  pending    [ ]  Catheterization:    [ ] Stress Test:  	  	    Home Medications:  Aspirin Enteric Coated 81 mg oral delayed release tablet: 1 tab(s) orally once a day (15 Nelson 2020 16:50)  atenolol 50 mg oral tablet: 1 tab(s) orally once a day (15 Nelson 2020 16:50)  atorvastatin 20 mg oral tablet: 1 tab(s) orally once a day (15 Nelson 2020 16:50)  Creon 24,000 units oral delayed release capsule:  (15 Nelson 2020 16:50)  esomeprazole 40 mg oral delayed release capsule: 1 cap(s) orally once a day (15 Nelson 2020 16:50)  glipizide-metformin 5 mg-500 mg oral tablet:  (15 Nelson 2020 16:50)  Jardiance 10 mg oral tablet: 1 tab(s) orally once a day (in the morning) (15 Nelson 2020 16:50)  losartan 100 mg oral tablet: 1 tab(s) orally once a day (15 Nelson 2020 16:50)  Nephplex Rx oral tablet: 1 tab(s) orally once a day (15 Nelson 2020 16:50)  ranolazine 500 mg oral tablet, extended release: 1 tab(s) orally 2 times a day (15 Nelson 2020 16:50)  Vascepa 1 g oral capsule: 2 cap(s) orally 2 times a day (15 Nelson 2020 16:50)    MEDICATIONS  (STANDING):  aspirin enteric coated 81 milliGRAM(s) Oral daily  ATENolol  Tablet 50 milliGRAM(s) Oral daily  atorvastatin 20 milliGRAM(s) Oral at bedtime  dextrose 5%. 1000 milliLiter(s) (50 mL/Hr) IV Continuous <Continuous>  dextrose 50% Injectable 12.5 Gram(s) IV Push once  dextrose 50% Injectable 25 Gram(s) IV Push once  dextrose 50% Injectable 25 Gram(s) IV Push once  enoxaparin Injectable 40 milliGRAM(s) SubCutaneous daily  insulin glargine Injectable (LANTUS) 9 Unit(s) SubCutaneous at bedtime  insulin lispro (HumaLOG) corrective regimen sliding scale   SubCutaneous three times a day before meals  insulin lispro Injectable (HumaLOG) 6 Unit(s) SubCutaneous three times a day before meals  losartan 100 milliGRAM(s) Oral daily  pancrelipase  (CREON 12,000 Lipase Units) 2 Capsule(s) Oral three times a day with meals  pantoprazole    Tablet 40 milliGRAM(s) Oral before breakfast  ranolazine 500 milliGRAM(s) Oral two times a day    MEDICATIONS  (PRN):  dextrose 40% Gel 15 Gram(s) Oral once PRN Blood Glucose LESS THAN 70 milliGRAM(s)/deciliter  glucagon  Injectable 1 milliGRAM(s) IntraMuscular once PRN Glucose LESS THAN 70 milligrams/deciliter

## 2020-01-16 NOTE — CONSULT NOTE ADULT - ASSESSMENT
74 y/f with PMH of CAD s/p stents in 2018, DLD, HTN, DM, Pancreatic cancer was brought in by family for chest pain that started this AM.  She has chronic intermittent belly pain, which family attributes to her pancreatic cancer, in the past she had pancreatic tail resection, partial liver resection, however the cancer recently recurred  and was told that it is inoperable.     A & P:    HTN  CAD s/p MI s/p PCI in 2018 (obtain records)  atypical chest pain  right sided neck pain    -EKG showed ST elevation in aVR, and anterolateral TW flattening and inferior ST depressions  -pain palpable on exam, and associated with viral URI symptoms  -rule out underlying sepsis/infection, obtain blood cultures  -negative serial cardiac troponins  -obtain records of prior PCI from her Cardiologist   -c/w ASA 81mg, statin, beta-blocker, ARB  -palpable, pulsatile neck mass in the right neck, would recommend vascular duplex, or CT neck to rule out AVF, pseudoaneurysm, or hematoma

## 2020-01-17 ENCOUNTER — TRANSCRIPTION ENCOUNTER (OUTPATIENT)
Age: 75
End: 2020-01-17

## 2020-01-17 VITALS — TEMPERATURE: 97 F

## 2020-01-17 LAB
APPEARANCE UR: CLEAR — SIGNIFICANT CHANGE UP
BILIRUB UR-MCNC: NEGATIVE — SIGNIFICANT CHANGE UP
COLOR SPEC: YELLOW — SIGNIFICANT CHANGE UP
CULTURE RESULTS: SIGNIFICANT CHANGE UP
DIFF PNL FLD: NEGATIVE — SIGNIFICANT CHANGE UP
GLUCOSE BLDC GLUCOMTR-MCNC: 137 MG/DL — HIGH (ref 70–99)
GLUCOSE BLDC GLUCOMTR-MCNC: 202 MG/DL — HIGH (ref 70–99)
GLUCOSE BLDC GLUCOMTR-MCNC: 253 MG/DL — HIGH (ref 70–99)
GLUCOSE UR QL: ABNORMAL
KETONES UR-MCNC: NEGATIVE — SIGNIFICANT CHANGE UP
LEUKOCYTE ESTERASE UR-ACNC: NEGATIVE — SIGNIFICANT CHANGE UP
NITRITE UR-MCNC: NEGATIVE — SIGNIFICANT CHANGE UP
PH UR: 6.5 — SIGNIFICANT CHANGE UP (ref 5–8)
PROT UR-MCNC: SIGNIFICANT CHANGE UP
SP GR SPEC: 1.03 — HIGH (ref 1.01–1.02)
SPECIMEN SOURCE: SIGNIFICANT CHANGE UP
UROBILINOGEN FLD QL: ABNORMAL

## 2020-01-17 PROCEDURE — 99233 SBSQ HOSP IP/OBS HIGH 50: CPT

## 2020-01-17 PROCEDURE — 99497 ADVNCD CARE PLAN 30 MIN: CPT | Mod: 25

## 2020-01-17 RX ORDER — CEFTRIAXONE 500 MG/1
1000 INJECTION, POWDER, FOR SOLUTION INTRAMUSCULAR; INTRAVENOUS EVERY 24 HOURS
Refills: 0 | Status: DISCONTINUED | OUTPATIENT
Start: 2020-01-18 | End: 2020-01-17

## 2020-01-17 RX ORDER — CEFTRIAXONE 500 MG/1
INJECTION, POWDER, FOR SOLUTION INTRAMUSCULAR; INTRAVENOUS
Refills: 0 | Status: DISCONTINUED | OUTPATIENT
Start: 2020-01-17 | End: 2020-01-17

## 2020-01-17 RX ORDER — CEPHALEXIN 500 MG
1 CAPSULE ORAL
Qty: 12 | Refills: 0
Start: 2020-01-17 | End: 2020-01-22

## 2020-01-17 RX ORDER — CEFTRIAXONE 500 MG/1
1000 INJECTION, POWDER, FOR SOLUTION INTRAMUSCULAR; INTRAVENOUS ONCE
Refills: 0 | Status: COMPLETED | OUTPATIENT
Start: 2020-01-17 | End: 2020-01-17

## 2020-01-17 RX ORDER — ACETAMINOPHEN 500 MG
650 TABLET ORAL ONCE
Refills: 0 | Status: COMPLETED | OUTPATIENT
Start: 2020-01-17 | End: 2020-01-17

## 2020-01-17 RX ORDER — AZITHROMYCIN 500 MG/1
500 TABLET, FILM COATED ORAL ONCE
Refills: 0 | Status: COMPLETED | OUTPATIENT
Start: 2020-01-17 | End: 2020-01-17

## 2020-01-17 RX ORDER — ACETAMINOPHEN 500 MG
1 TABLET ORAL
Qty: 90 | Refills: 0
Start: 2020-01-17 | End: 2020-02-15

## 2020-01-17 RX ORDER — AZITHROMYCIN 500 MG/1
TABLET, FILM COATED ORAL
Refills: 0 | Status: DISCONTINUED | OUTPATIENT
Start: 2020-01-17 | End: 2020-01-17

## 2020-01-17 RX ORDER — AZITHROMYCIN 500 MG/1
500 TABLET, FILM COATED ORAL EVERY 24 HOURS
Refills: 0 | Status: DISCONTINUED | OUTPATIENT
Start: 2020-01-18 | End: 2020-01-17

## 2020-01-17 RX ORDER — ACETAMINOPHEN 500 MG
650 TABLET ORAL EVERY 6 HOURS
Refills: 0 | Status: DISCONTINUED | OUTPATIENT
Start: 2020-01-17 | End: 2020-01-17

## 2020-01-17 RX ORDER — ACETAMINOPHEN 500 MG
2 TABLET ORAL
Qty: 0 | Refills: 0 | DISCHARGE
Start: 2020-01-17

## 2020-01-17 RX ADMIN — Medication 2 CAPSULE(S): at 08:59

## 2020-01-17 RX ADMIN — Medication 650 MILLIGRAM(S): at 18:00

## 2020-01-17 RX ADMIN — PANTOPRAZOLE SODIUM 40 MILLIGRAM(S): 20 TABLET, DELAYED RELEASE ORAL at 05:56

## 2020-01-17 RX ADMIN — Medication 2 CAPSULE(S): at 17:04

## 2020-01-17 RX ADMIN — Medication 650 MILLIGRAM(S): at 17:58

## 2020-01-17 RX ADMIN — ENOXAPARIN SODIUM 40 MILLIGRAM(S): 100 INJECTION SUBCUTANEOUS at 13:32

## 2020-01-17 RX ADMIN — ATENOLOL 50 MILLIGRAM(S): 25 TABLET ORAL at 05:57

## 2020-01-17 RX ADMIN — Medication 81 MILLIGRAM(S): at 13:31

## 2020-01-17 RX ADMIN — Medication 650 MILLIGRAM(S): at 15:37

## 2020-01-17 RX ADMIN — Medication 3: at 17:05

## 2020-01-17 RX ADMIN — Medication 6 UNIT(S): at 17:04

## 2020-01-17 RX ADMIN — RANOLAZINE 500 MILLIGRAM(S): 500 TABLET, FILM COATED, EXTENDED RELEASE ORAL at 05:56

## 2020-01-17 RX ADMIN — RANOLAZINE 500 MILLIGRAM(S): 500 TABLET, FILM COATED, EXTENDED RELEASE ORAL at 17:04

## 2020-01-17 RX ADMIN — LOSARTAN POTASSIUM 100 MILLIGRAM(S): 100 TABLET, FILM COATED ORAL at 05:57

## 2020-01-17 RX ADMIN — CEFTRIAXONE 100 MILLIGRAM(S): 500 INJECTION, POWDER, FOR SOLUTION INTRAMUSCULAR; INTRAVENOUS at 15:30

## 2020-01-17 RX ADMIN — AZITHROMYCIN 255 MILLIGRAM(S): 500 TABLET, FILM COATED ORAL at 15:30

## 2020-01-17 NOTE — DISCHARGE NOTE PROVIDER - NSDCCPCAREPLAN_GEN_ALL_CORE_FT
PRINCIPAL DISCHARGE DIAGNOSIS  Diagnosis: Chest pain  Assessment and Plan of Treatment: Atypical chest pain. ACS ruled out. Follow upw ith Cardiology outpatient if desired.      SECONDARY DISCHARGE DIAGNOSES  Diagnosis: Pancreatic cancer  Assessment and Plan of Treatment: As it was discussed that you are no longer a candidate for surgery or chemotherapy and goal of care is to be comfortable, you were given information to contact hospice who may be able to provide extra help at home and with end of life care management. PRINCIPAL DISCHARGE DIAGNOSIS  Diagnosis: Chest pain  Assessment and Plan of Treatment: Atypical chest pain. ACS ruled out. Follow upw ith Cardiology outpatient if desired.      SECONDARY DISCHARGE DIAGNOSES  Diagnosis: Pancreatic cancer  Assessment and Plan of Treatment: As it was discussed that you are no longer a candidate for surgery or chemotherapy and goal of care is to be comfortable, you were given information to contact hospice who may be able to provide extra help at home and with end of life care management. Please call hospice to set up assistance at home.

## 2020-01-17 NOTE — SWALLOW BEDSIDE ASSESSMENT ADULT - SLP PERTINENT HISTORY OF CURRENT PROBLEM
pt admitted from home for chest pain; PMHx: CAD s/p stents in 2018, DM, Pancreatic cancer (recently recurred and reportedly inoperable); pt being treated for atypical chest pain and fever UA and CXR (-) and R-sided neck pain. +code STEMI which was later cancelled.

## 2020-01-17 NOTE — DIETITIAN INITIAL EVALUATION ADULT. - ENERGY INTAKE
per daughter report, pt eats poorly and munch on things throughout the day. But is drinking Glucerna shake Poor (<50%)

## 2020-01-17 NOTE — DISCHARGE NOTE PROVIDER - NSDCMRMEDTOKEN_GEN_ALL_CORE_FT
allopurinol 100 mg oral tablet: 1 tab(s) orally 2 times a day  aspirin 81 mg oral tablet: 1 tab(s) orally once a day  atenolol 50 mg oral tablet: 1 tab(s) orally once a day  atorvastatin 20 mg oral tablet: 1 tab(s) orally once a day  Cozaar 100 mg oral tablet: 1 tab(s) orally once a day  Creon 24,000 units oral delayed release capsule:   esomeprazole 40 mg oral delayed release capsule: 1 cap(s) orally once a day  Evista 60 mg oral tablet: 1 tab(s) orally once a day  glipiZIDE-metFORMIN 5 mg-500 mg oral tablet: 1 tab(s) orally 2 times a day  Jardiance 10 mg oral tablet: 1 tab(s) orally once a day (in the morning)  Nephplex Rx oral tablet: 1 tab(s) orally once a day  Percocet 5/325 325 mg-5 mg oral tablet: 1 tab(s) orally every 6 hours, As Needed -for severe pain MDD:6  ranolazine 500 mg oral tablet, extended release: 1 tab(s) orally 2 times a day  Vascepa 1 g oral capsule: 2 cap(s) orally 2 times a day acetaminophen 325 mg oral tablet: 2 tab(s) orally every 6 hours, As needed, Temp greater or equal to 38C (100.4F)  allopurinol 100 mg oral tablet: 1 tab(s) orally 2 times a day  aspirin 81 mg oral tablet: 1 tab(s) orally once a day  atenolol 50 mg oral tablet: 1 tab(s) orally once a day  atorvastatin 20 mg oral tablet: 1 tab(s) orally once a day  cephalexin 500 mg oral tablet: 1 tab(s) orally 2 times a day   Cozaar 100 mg oral tablet: 1 tab(s) orally once a day  Creon 24,000 units oral delayed release capsule:   esomeprazole 40 mg oral delayed release capsule: 1 cap(s) orally once a day  Evista 60 mg oral tablet: 1 tab(s) orally once a day  glipiZIDE-metFORMIN 5 mg-500 mg oral tablet: 1 tab(s) orally 2 times a day  Jardiance 10 mg oral tablet: 1 tab(s) orally once a day (in the morning)  Nephplex Rx oral tablet: 1 tab(s) orally once a day  Percocet 5/325 325 mg-5 mg oral tablet: 1 tab(s) orally every 6 hours, As Needed -for severe pain MDD:6  ranolazine 500 mg oral tablet, extended release: 1 tab(s) orally 2 times a day  Vascepa 1 g oral capsule: 2 cap(s) orally 2 times a day

## 2020-01-17 NOTE — DIETITIAN INITIAL EVALUATION ADULT. - ADD RECOMMEND
RD has discussed with daughter regarding pt experiencing abd discomfort most of the time MAY BE from excessive amount of CREONS given (v.s. other etiologies). Because pt has been given 12,000 CREONs 3x meals a day, while pt's eats very little, >4 times a day, pt may need to speak with outpt PMD or GI to eval dosage. Otherwise, (2) please add GLUCERNA SHAKE q8hr to current diet Dysphagia 3 and carbohydrate consistent with snack diet.

## 2020-01-17 NOTE — GOALS OF CARE CONVERSATION - ADVANCED CARE PLANNING - CONVERSATION DETAILS
I spoke with daughter in law regarding pt's diagnosis, prognosis, advance directives and possibility of hospice at home. I gave [facility] hospice contact number for family to call when they are ready to transition the level of care to hospice/ end of life.  I provided MOLST form to the daughter in law to read and understand different options.

## 2020-01-17 NOTE — PROGRESS NOTE ADULT - ATTENDING COMMENTS
Pt was seen and examined at bedside independently, pt is comfortable today, denies any complaints. I spoke with daughter in law Mildred [phone]. Pt was diagnosed with pancreatic cancer back in 2018, underwent surgical resection, pt follows up with oncology at [facility], two month ago she was told that cancer came back, this time pt and family decided not to proceed with cancer directed therapy, they want pt to be comfortable and spent time with family at home. I discussed advance directives and possibility of hospice with daughter in law, gave her a MOLST form to read and provided hospice care contact number.   Urine Cx came back positive for streptococcus gallolyticus, will d/c on po Keflex.   I agree with medical resident's findings, assessment and plan ( note was edited by me).  Pt is stable for discharge home, family might transition level of care to hospice/ comfort care at home.   Goals of care discussed with family.

## 2020-01-17 NOTE — DISCHARGE NOTE PROVIDER - HOSPITAL COURSE
74 y/f with PMH of CAD s/p stents in 2018, DM, Pancreatic cancer was brought in by family for chest pain that started morning of presentation.    The chest pain was midsternal, throbbing, non radiating, not related to exertion, constant. She didn't have any cough, but was having congestion and was trying to get phlegm out but not able to do so. She didn't have any fever or chills at home. She has chronic intermittent belly pain, which family attributes to her pancreatic cancer, in the past she had pancreatic tail resection, partial liver resection, however the cancer recently recurred  and was told that it is inoperable. She has chronic intermittent diarrhea, no change in urinary habits. No lower ext swelling or edema. Her mental status has worsened over the past few months. Patient found to have t wave inversions. Admitted to rule out ACS. Troponin' s were trended and negative x 3. Per Dr. Cedillo, her cardiologist, these TWI are old. Concern for pulsatile neck from cardiology, CT scan chest dissection protocol done to rule out dissection. VA doppler neck was negative for aneurysm. VA lower extremities is negative for DVT but positive for superficial vein thrombosis in left peroneal vein branch. Echo showed EF of 57% and grade 1 diastolic dysfunction and mild mitral valve regurgitation. 74 y/f with PMH of CAD s/p stents in 2018, DM, recurrent Pancreatic cancer was brought in by family for chest pain that started morning of presentation.    The chest pain was midsternal, throbbing, non radiating, not related to exertion, constant. She didn't have any cough, but was having congestion and was trying to get phlegm out but not able to do so. She didn't have any fever or chills at home. She has chronic intermittent belly pain, which family attributes to her pancreatic cancer, in the past she had pancreatic tail resection, partial liver resection, however the cancer recently recurred  and was told that it is inoperable. She has chronic intermittent diarrhea, no change in urinary habits. No lower ext swelling or edema. Her mental status has worsened over the past few months. Patient found to have t wave inversions. Admitted to rule out ACS. Troponin' s were trended and negative x 3. Per Dr. Cedillo, her cardiologist, these TWI are old. Concern for pulsatile neck from cardiology, CT scan chest dissection protocol done to rule out dissection. VA doppler neck was negative for aneurysm. VA lower extremities is negative for DVT but positive for superficial vein thrombosis in left peroneal vein branch. Echo showed EF of 57% and grade 1 diastolic dysfunction and mild mitral valve regurgitation. ACS ruled out.        Patient did have fever and Urine culture positive for strep gallolyticus.        As patient has cancer and no longer candidate for surgery or chemotherapy per family, hospice consulted, patient given information for hospice while outpatient to consider extra help and making the patient more comfortable . 74 y/f with PMH of CAD s/p stents in 2018, DM, recurrent Pancreatic cancer was brought in by family for chest pain that started morning of presentation.    The chest pain was midsternal, throbbing, non radiating, not related to exertion, constant. She didn't have any cough, but was having congestion and was trying to get phlegm out but not able to do so. She didn't have any fever or chills at home. She has chronic intermittent belly pain, which family attributes to her pancreatic cancer, in the past she had pancreatic tail resection, partial liver resection, however the cancer recently recurred  and was told that it is inoperable. She has chronic intermittent diarrhea, no change in urinary habits. No lower ext swelling or edema. Her mental status has worsened over the past few months. Patient found to have t wave inversions. Admitted to rule out ACS. Troponin' s were trended and negative x 3. Per Dr. Cedillo, her cardiologist, these TWI are old. Concern for pulsatile neck from cardiology, CT scan chest dissection protocol done to rule out dissection. VA doppler neck was negative for aneurysm. VA lower extremities is negative for DVT but positive for superficial vein thrombosis in left peroneal vein branch. Echo showed EF of 57% and grade 1 diastolic dysfunction and mild mitral valve regurgitation. ACS ruled out.        Patient did have fever and Urine culture positive for strep gallolyticus.        As patient has cancer and no longer candidate for surgery or chemotherapy per family, hospice consulted, patient given information for hospice and wants to set up hospice outpatient to get extra help and making the patient more comfortable.

## 2020-01-17 NOTE — PROGRESS NOTE ADULT - SUBJECTIVE AND OBJECTIVE BOX
Hospital Day:  2d    Subjective:    Patient is a 74y old  Female who presents with a chief complaint of Chest pain (2020 09:05)    Interval: No acute events overnight. Talked to patient with daughter in law at bedside, preferred, over phone . Patient not currently in pain or short of breath. Says she feels better otherwise. No fevers since yesterday morning. Urine culture grew strep gallolyticus. Patient also has recurrence of pancreatic cancer, no longer candidate for surgery or chemo. Discussed hospice with patients family.     Past Medical Hx:   MI (myocardial infarction)  Diabetes  Hypertension  Pancreatic cancer  Hyperlipidemia  GERD (gastroesophageal reflux disease)  Gout  DM (diabetes mellitus)  HTN (hypertension)    Past Sx:  History of pancreatic surgery  No significant past surgical history    Allergies:  Allergy Status Unknown  No Known Allergies    Current Meds:   Standng Meds:  aspirin enteric coated 81 milliGRAM(s) Oral daily  ATENolol  Tablet 50 milliGRAM(s) Oral daily  atorvastatin 20 milliGRAM(s) Oral at bedtime  dextrose 5%. 1000 milliLiter(s) (50 mL/Hr) IV Continuous <Continuous>  dextrose 50% Injectable 12.5 Gram(s) IV Push once  dextrose 50% Injectable 25 Gram(s) IV Push once  dextrose 50% Injectable 25 Gram(s) IV Push once  enoxaparin Injectable 40 milliGRAM(s) SubCutaneous daily  insulin glargine Injectable (LANTUS) 9 Unit(s) SubCutaneous at bedtime  insulin lispro (HumaLOG) corrective regimen sliding scale   SubCutaneous three times a day before meals  insulin lispro Injectable (HumaLOG) 6 Unit(s) SubCutaneous three times a day before meals  losartan 100 milliGRAM(s) Oral daily  pancrelipase  (CREON 12,000 Lipase Units) 2 Capsule(s) Oral three times a day with meals  pantoprazole    Tablet 40 milliGRAM(s) Oral before breakfast  ranolazine 500 milliGRAM(s) Oral two times a day    PRN Meds:  dextrose 40% Gel 15 Gram(s) Oral once PRN Blood Glucose LESS THAN 70 milliGRAM(s)/deciliter  glucagon  Injectable 1 milliGRAM(s) IntraMuscular once PRN Glucose LESS THAN 70 milligrams/deciliter    HOME MEDICATIONS:  allopurinol 100 mg oral tablet: 1 tab(s) orally 2 times a day  aspirin 81 mg oral tablet: 1 tab(s) orally once a day  atenolol 50 mg oral tablet: 1 tab(s) orally once a day  atorvastatin 20 mg oral tablet: 1 tab(s) orally once a day  Cozaar 100 mg oral tablet: 1 tab(s) orally once a day  Creon 24,000 units oral delayed release capsule:   esomeprazole 40 mg oral delayed release capsule: 1 cap(s) orally once a day  Evista 60 mg oral tablet: 1 tab(s) orally once a day  glipiZIDE-metFORMIN 5 mg-500 mg oral tablet: 1 tab(s) orally 2 times a day  Jardiance 10 mg oral tablet: 1 tab(s) orally once a day (in the morning)  Nephplex Rx oral tablet: 1 tab(s) orally once a day  ranolazine 500 mg oral tablet, extended release: 1 tab(s) orally 2 times a day  Vascepa 1 g oral capsule: 2 cap(s) orally 2 times a day      Vital Signs:   T(F): 99.6 (20 @ 08:29), Max: 99.6 (20 @ 08:29)  HR: 75 (20 @ 05:09) (75 - 80)  BP: 143/75 (20 @ 05:09) (143/75 - 154/67)  RR: 16 (20 @ 05:09) (16 - 18)  SpO2: 96% (20 @ 08:10) (96% - 98%)        Physical Exam:   GENERAL: NAD, frail appearing  HEENT: NCAT  CHEST/LUNG: CTAB  HEART: Regular rate and rhythm; s1 s2 appreciated, No murmurs, rubs, or gallops  ABDOMEN: Soft, Nontender, Nondistended; Bowel sounds present  EXTREMITIES: No LE edema b/l  NERVOUS SYSTEM: non-focal    Labs:                         10.6   6.47  )-----------( 196      ( 2020 06:51 )             32.1       2020 06:51    139    |  105    |  20     ----------------------------<  134    4.0     |  22     |  0.7      Ca    9.7        2020 06:51  Mg     1.9       15 Nelson 2020 13:25    TPro  7.4    /  Alb  3.6    /  TBili  0.8    /  DBili  x      /  AST  112    /  ALT  59     /  AlkPhos  126    2020 06:51          Hemoglobin A1C, Whole Blood: 6.2 % (20 @ 06:51)    Serum Pro-Brain Natriuretic Peptide: 783 pg/mL (01-15-20 @ 13:25)    Troponin <0.01, CKMB 1.0, CK -- 20 @ 00:08  Troponin <0.01, CKMB <1.0, CK -- 01-15-20 @ 20:48  Troponin <0.01, CKMB <1.0, CK 65 01-15-20 @ 13:25    Urinalysis Basic - ( 2020 03:00 )    Color: Yellow / Appearance: Clear / S.030 / pH: x  Gluc: x / Ketone: Negative  / Bili: Negative / Urobili: 3 mg/dL   Blood: x / Protein: Trace / Nitrite: Negative   Leuk Esterase: Negative / RBC: x / WBC x   Sq Epi: x / Non Sq Epi: x / Bacteria: x      Radiology:     none today Hospital Day:  2d    Subjective:    Patient is a 74y old  Female who presents with a chief complaint of Chest pain (2020 09:05)    Interval: No acute events overnight. Talked to patient with daughter in law at bedside, preferred, over phone . Patient not currently in pain or short of breath. Says she feels better otherwise. No fevers since yesterday morning. Urine culture grew strep gallolyticus. Patient also has recurrence of pancreatic cancer, no longer candidate for surgery or chemo. Discussed hospice with patients family.     Past Medical Hx:   MI (myocardial infarction)  Diabetes  Hypertension  Pancreatic cancer  Hyperlipidemia  GERD (gastroesophageal reflux disease)  Gout  DM (diabetes mellitus)  HTN (hypertension)    Past Sx:  History of pancreatic surgery  No significant past surgical history    Allergies:  Allergy Status Unknown  No Known Allergies    Current Meds:   Standng Meds:  aspirin enteric coated 81 milliGRAM(s) Oral daily  ATENolol  Tablet 50 milliGRAM(s) Oral daily  atorvastatin 20 milliGRAM(s) Oral at bedtime  dextrose 5%. 1000 milliLiter(s) (50 mL/Hr) IV Continuous <Continuous>  dextrose 50% Injectable 12.5 Gram(s) IV Push once  dextrose 50% Injectable 25 Gram(s) IV Push once  dextrose 50% Injectable 25 Gram(s) IV Push once  enoxaparin Injectable 40 milliGRAM(s) SubCutaneous daily  insulin glargine Injectable (LANTUS) 9 Unit(s) SubCutaneous at bedtime  insulin lispro (HumaLOG) corrective regimen sliding scale   SubCutaneous three times a day before meals  insulin lispro Injectable (HumaLOG) 6 Unit(s) SubCutaneous three times a day before meals  losartan 100 milliGRAM(s) Oral daily  pancrelipase  (CREON 12,000 Lipase Units) 2 Capsule(s) Oral three times a day with meals  pantoprazole    Tablet 40 milliGRAM(s) Oral before breakfast  ranolazine 500 milliGRAM(s) Oral two times a day    PRN Meds:  dextrose 40% Gel 15 Gram(s) Oral once PRN Blood Glucose LESS THAN 70 milliGRAM(s)/deciliter  glucagon  Injectable 1 milliGRAM(s) IntraMuscular once PRN Glucose LESS THAN 70 milligrams/deciliter    HOME MEDICATIONS:  allopurinol 100 mg oral tablet: 1 tab(s) orally 2 times a day  aspirin 81 mg oral tablet: 1 tab(s) orally once a day  atenolol 50 mg oral tablet: 1 tab(s) orally once a day  atorvastatin 20 mg oral tablet: 1 tab(s) orally once a day  Cozaar 100 mg oral tablet: 1 tab(s) orally once a day  Creon 24,000 units oral delayed release capsule:   esomeprazole 40 mg oral delayed release capsule: 1 cap(s) orally once a day  Evista 60 mg oral tablet: 1 tab(s) orally once a day  glipiZIDE-metFORMIN 5 mg-500 mg oral tablet: 1 tab(s) orally 2 times a day  Jardiance 10 mg oral tablet: 1 tab(s) orally once a day (in the morning)  Nephplex Rx oral tablet: 1 tab(s) orally once a day  ranolazine 500 mg oral tablet, extended release: 1 tab(s) orally 2 times a day  Vascepa 1 g oral capsule: 2 cap(s) orally 2 times a day      Vital Signs:   T(F): 99.6 (20 @ 08:29), Max: 99.6 (20 @ 08:29)  HR: 75 (20 @ 05:09) (75 - 80)  BP: 143/75 (20 @ 05:09) (143/75 - 154/67)  RR: 16 (20 @ 05:09) (16 - 18)  SpO2: 96% (20 @ 08:10) (96% - 98%)        Physical Exam:   GENERAL: NAD, frail appearing  HEENT: NCAT  CHEST/LUNG: CTAB  HEART: Regular rate and rhythm; s1 s2 appreciated, No murmurs, rubs, or gallops  ABDOMEN: Soft, Nontender, Nondistended; Bowel sounds present  EXTREMITIES: No LE edema b/l  NERVOUS SYSTEM: non-focal    Labs:                         10.6   6.47  )-----------( 196      ( 2020 06:51 )             32.1       2020 06:51    139    |  105    |  20     ----------------------------<  134    4.0     |  22     |  0.7      Ca    9.7        2020 06:51  Mg     1.9       15 Nelson 2020 13:25    TPro  7.4    /  Alb  3.6    /  TBili  0.8    /  DBili  x      /  AST  112    /  ALT  59     /  AlkPhos  126    2020 06:51          Hemoglobin A1C, Whole Blood: 6.2 % (20 @ 06:51)    Serum Pro-Brain Natriuretic Peptide: 783 pg/mL (01-15-20 @ 13:25)    Troponin <0.01, CKMB 1.0, CK -- 20 @ 00:08  Troponin <0.01, CKMB <1.0, CK -- 01-15-20 @ 20:48  Troponin <0.01, CKMB <1.0, CK 65 01-15-20 @ 13:25    Urinalysis Basic - ( 2020 03:00 )    Color: Yellow / Appearance: Clear / S.030 / pH: x  Gluc: x / Ketone: Negative  / Bili: Negative / Urobili: 3 mg/dL   Blood: x / Protein: Trace / Nitrite: Negative   Leuk Esterase: Negative / RBC: x / WBC x   Sq Epi: x / Non Sq Epi: x / Bacteria: x  Culture - Urine (01.15.20 @ 14:50)    Specimen Source: .Urine Clean Catch (Midstream)    Culture Results:   50,000 - 99,000 CFU/mL Streptococcus gallolyticus "Susceptibilities not  performed"        Radiology:     none today

## 2020-01-17 NOTE — DIETITIAN INITIAL EVALUATION ADULT. - DIET TYPE
Pt is on a VEGETARIAN diet mostly at home, very picky and eats extremely little, throughout the day, but at least pt is drinking 2x Glucerna vanilla shake each day. No vitamin/supplement. NKFA. UBW around 115# before the diagnosis on 2018 but now is 107#. Gradual weight loss since 2018 pancreatic cancer dx.  Otherwise, pt does not meet malnutrition at this time. Daughter says she has always been thin.

## 2020-01-17 NOTE — DISCHARGE NOTE NURSING/CASE MANAGEMENT/SOCIAL WORK - PATIENT PORTAL LINK FT
You can access the FollowMyHealth Patient Portal offered by [facility] by registering at the following website: [email or web address]. By joining [employer]’s FollowMyHealth portal, you will also be able to view your health information using other applications (apps) compatible with our system.

## 2020-01-17 NOTE — DIETITIAN INITIAL EVALUATION ADULT. - CONTINUE CURRENT NUTRITION CARE PLAN
Pt to consume and tolerate >50% of all meals and snacks and rec'd supplements upon f/u in 4 days. Meals and snacks. Medical food supplement. RD to monitor diet order, energy intake, body composition, NFPF (appetite, PO tolerance)

## 2020-01-17 NOTE — CHART NOTE - NSCHARTNOTEFT_GEN_A_CORE
Upon Nutritional Assessment by the Registered Dietitian your patient was determined to meet criteria / has evidence of the following diagnosis/diagnoses:          [ ]  Mild Protein Calorie Malnutrition        [ ]  Moderate Protein Calorie Malnutrition        [ ] Severe Protein Calorie Malnutrition        [ ] Unspecified Protein Calorie Malnutrition        [ x] Underweight / BMI <19        [ ] Morbid Obesity / BMI > 40      Findings as based on:  •  Comprehensive nutrition assessment and consultation      ht: 162.6cm  Wt: 48.8kg  BMI: 18.5      Treatment:    The following diet has been recommended:  (1) RD has discussed with daughter regarding pt experiencing abd discomfort most of the time MAY be from excessive amount of CREONS given (v.s. other etiologies). There would be rare side effects from too much enzymes while lack of food. Because pt has been given 12,000 CREONs 3x meals a day, while pt's eats very little, >4 times a day, pt may need to speak with outpt PMD or GI to eval dosage. Otherwise,   (2) please add GLUCERNA SHAKE q8hr to current diet Dysphagia 3 and carbohydrate consistent with snack diet, and vegetarian modifier.        PROVIDER Section:     By signing this assessment you are acknowledging and agree with the diagnosis/diagnoses assigned by the Registered Dietitian    Comments:

## 2020-01-17 NOTE — SWALLOW BEDSIDE ASSESSMENT ADULT - SLP GENERAL OBSERVATIONS
pt received OOB to chair awake alert w/o c/o pain. +room air. pts daughter present at b/s who requested to provide translation. pt and family also deny hx of dysphagia.

## 2020-01-17 NOTE — DIETITIAN INITIAL EVALUATION ADULT. - OTHER INFO
p/w chest pain. hx of CAD, DM, pancreatic cancer. c/o mental status worsening over few months. Atypical chest pain. c/w meds. Pending ECHO. fever CXR clear. DM monitoring. c/w CREON. f/u ONC output

## 2020-01-17 NOTE — DIETITIAN INITIAL EVALUATION ADULT. - REASON INDICATOR FOR ASSESSMENT
Found BMI<19 - pt is sleeping but daughter-in-law reported that she is mostly alert and oriented (able to recall most things) with some confusion at times. No edema. Skin intact. LBM 2 days ago per daughter. Needs dentures full and requires SOFT foods. Mostly a vegetarian at home, eats very small frequent meals/snacks.

## 2020-01-17 NOTE — DISCHARGE NOTE PROVIDER - PROVIDER TOKENS
FREE:[LAST:[thom],FIRST:[jessica],PHONE:[phone]],FAX:[(   [phone]    -],ADDRESS:[[address] [place] [address] [address] [place] [address]]]

## 2020-01-17 NOTE — PROGRESS NOTE ADULT - ASSESSMENT
74 y/f with PMH of CAD s/p stents in 2018, DLD, HTN, DM, Pancreatic cancer was brought in by family for chest pain. She has chronic intermittent belly pain, which family attributes to her pancreatic cancer, in the past she had pancreatic tail resection, partial liver resection, however the cancer recently recurred  and was told that it is inoperable.    # Atypical chest pain  -History of CAD s/p MI s/p PCI in 2018. Called Dr. Cedillo office [phone] Twave changes in the anterior leads are old.   -negative serial cardiac troponins  -c/w ASA 81mg, statin, beta-blocker, ARB  -CT scan chest dissection protocol done to rule out dissection.   -VA doppler neck- negative for aneurysm. VA lower extremities is negative for DVT but positive for superficial vein thrombosis in left peroneal vein branch.   -< from: Transthoracic Echocardiogram (01.16.20 @ 11:09) >   1. LVEjection Fraction by Wing's Method with a biplane EF of 57 %.   2. Spectral Doppler shows impaired relaxation pattern of left ventricular myocardial filling (Grade I diastolic dysfunction).   3. Mild mitral valve regurgitation.    # Fever  -now afebrile  -UA negative, Chest Xray looks clear.   -blood cultures pending, urine culture 1/15 strep gallolyticus    # DM:  -Monitor FS and insulin if >180    #Pancreatic cancer:  -C/w Creon  -Will f/u with oncology as outpatient  -hospice consult, may f/u OP with them    # DVT prophylaxis  -On Lovenox    Dispo: Possible d/c today or tomorrow 74 y/f with PMH of CAD s/p stents in 2018, DLD, HTN, DM, Pancreatic cancer was brought in by family for chest pain. She has chronic intermittent belly pain, which family attributes to her pancreatic cancer, in the past she had pancreatic tail resection, partial liver resection, however the cancer recently recurred  and was told that it is inoperable.    # Atypical chest pain  -History of CAD s/p MI s/p PCI in 2018. Called Dr. Cedillo office [phone] Twave changes in the anterior leads are old.   -negative serial cardiac troponins  -c/w ASA 81mg, statin, beta-blocker, ARB  -CT scan chest dissection protocol done to rule out dissection.   -VA doppler neck- negative for aneurysm. VA lower extremities is negative for DVT but positive for superficial vein thrombosis in left peroneal vein branch.   -< from: Transthoracic Echocardiogram (01.16.20 @ 11:09) >   1. LVEjection Fraction by Wing's Method with a biplane EF of 57 %.   2. Spectral Doppler shows impaired relaxation pattern of left ventricular myocardial filling (Grade I diastolic dysfunction).   3. Mild mitral valve regurgitation.    # Fever/ suspected UTI ( urine Cx grew streptococcus gallolyticus   -now afebrile  - Chest Xray looks clear.   -blood cultures pending, urine culture 1/15 strep gallolyticus  - pt was on IV ABx while in the hospital will d/c home on po Keflex to complete 7 days course     # DM:  -Monitor FS and insulin if >180    #Pancreatic cancer:  -C/w Creon  -Will f/u with oncology as outpatient  -hospice consult, may f/u OP with them    # DVT prophylaxis  -On Lovenox    Dispo: Possible d/c today or tomorrow

## 2020-01-17 NOTE — DISCHARGE NOTE PROVIDER - CARE PROVIDER_API CALL
jessica tomas  [address] [place] [address] [id number]  [place] [address]  Phone: [phone]  Fax: (   )    -  Follow Up Time:

## 2020-01-21 LAB
CULTURE RESULTS: SIGNIFICANT CHANGE UP
CULTURE RESULTS: SIGNIFICANT CHANGE UP
SPECIMEN SOURCE: SIGNIFICANT CHANGE UP
SPECIMEN SOURCE: SIGNIFICANT CHANGE UP

## 2020-01-22 DIAGNOSIS — Z95.5 PRESENCE OF CORONARY ANGIOPLASTY IMPLANT AND GRAFT: ICD-10-CM

## 2020-01-22 DIAGNOSIS — B95.4 OTHER STREPTOCOCCUS AS THE CAUSE OF DISEASES CLASSIFIED ELSEWHERE: ICD-10-CM

## 2020-01-22 DIAGNOSIS — Z79.899 OTHER LONG TERM (CURRENT) DRUG THERAPY: ICD-10-CM

## 2020-01-22 DIAGNOSIS — R07.9 CHEST PAIN, UNSPECIFIED: ICD-10-CM

## 2020-01-22 DIAGNOSIS — I10 ESSENTIAL (PRIMARY) HYPERTENSION: ICD-10-CM

## 2020-01-22 DIAGNOSIS — R07.89 OTHER CHEST PAIN: ICD-10-CM

## 2020-01-22 DIAGNOSIS — I25.10 ATHEROSCLEROTIC HEART DISEASE OF NATIVE CORONARY ARTERY WITHOUT ANGINA PECTORIS: ICD-10-CM

## 2020-01-22 DIAGNOSIS — N39.0 URINARY TRACT INFECTION, SITE NOT SPECIFIED: ICD-10-CM

## 2020-01-22 DIAGNOSIS — I25.2 OLD MYOCARDIAL INFARCTION: ICD-10-CM

## 2020-10-05 NOTE — ED PROVIDER NOTE - CRITICAL CARE ATTESTATION
I have personally provided the amount of critical care time documented below excluding time spent on separate procedures
97.4